# Patient Record
Sex: FEMALE | Race: WHITE | NOT HISPANIC OR LATINO | URBAN - NONMETROPOLITAN AREA
[De-identification: names, ages, dates, MRNs, and addresses within clinical notes are randomized per-mention and may not be internally consistent; named-entity substitution may affect disease eponyms.]

---

## 2022-07-05 ENCOUNTER — OFFICE (OUTPATIENT)
Dept: URBAN - NONMETROPOLITAN AREA CLINIC 9 | Facility: CLINIC | Age: 22
End: 2022-07-05

## 2022-07-05 VITALS
HEART RATE: 81 BPM | RESPIRATION RATE: 18 BRPM | SYSTOLIC BLOOD PRESSURE: 111 MMHG | WEIGHT: 147 LBS | HEIGHT: 62 IN | DIASTOLIC BLOOD PRESSURE: 79 MMHG

## 2022-07-05 DIAGNOSIS — Z12.11 ENCOUNTER FOR SCREENING FOR MALIGNANT NEOPLASM OF COLON: ICD-10-CM

## 2022-07-05 DIAGNOSIS — R10.32 LEFT LOWER QUADRANT PAIN: ICD-10-CM

## 2022-07-05 DIAGNOSIS — R14.0 ABDOMINAL DISTENSION (GASEOUS): ICD-10-CM

## 2022-07-05 DIAGNOSIS — R11.0 NAUSEA: ICD-10-CM

## 2022-07-05 PROCEDURE — 99204 OFFICE O/P NEW MOD 45 MIN: CPT | Performed by: INTERNAL MEDICINE

## 2022-07-05 NOTE — SERVICENOTES
Given patient's alternating diarrhea and constipation I have counseled her to continue with soluble fiber use.  Will also obtain stool studies to rule out infectious or inflammatory cause.  If fecal calprotectin is elevated will plan for colonoscopy.  Will give ib Rohit given left lower quadrant abdominal cramping.  For her bloating I have counseled her on stopping dairy products, stopping chewing gum/drinking through straw/drinking carbonated beverages, as well as trialing off foods outlined above.  Also will obtain CSID testing.  If all is unremarkable and she continues with symptoms a 2 month follow up would consider cross-sectional imaging and if negative plan for initiation on a TCA with amitriptyline.

## 2022-07-05 NOTE — SERVICEHPINOTES
CHANDA PATEL   is a   22 yo  female  with no significant past medical history who presents to establish care for postprandial abdominal pain, alternating diarrhea/constipation, nausea, and bloating.  Patient reports since April she has developed severe pain in her left lower quadrant after eating.  The pain will wrap around into her back.  It has been rated as high as a 7/10.  She  denies any association of her bowel movements with improvement in the pain.  She denies any vomiting, weight loss, bright blood per rectum, melena, dysphagia, odynophagia, rashes.  She does endorse weight gain, fatigue, nausea, alternating diarrhea and constipation, and significant bloating.  She denies smoking or illicit drug use.  She will occasionally drink alcohol.  She does utilize Excedrin given frequent migraines.  She has been taking Benefiber and seen some improvement in her alternating diarrhea and constipation.  She does endorse drinking through a straw as well as drinking carbonated beverages.  She has never attempted a trial off dairy products.  She has not had any stool studies to rule out infectious cause.  She has never attempted ib Rohit.  She denies previously undergoing an EGD or colonoscopy.  She denies any family history of colorectal cancer or inflammatory bowel disease.  She is starting law school in the fall.  She has not noticed any association of her symptoms with stress.  Her only other daily medication is birth control.  She has been on this for 5 years with no recent changes.

## 2022-09-06 ENCOUNTER — OFFICE (OUTPATIENT)
Dept: URBAN - NONMETROPOLITAN AREA CLINIC 5 | Facility: CLINIC | Age: 22
End: 2022-09-06

## 2022-09-06 VITALS
HEART RATE: 86 BPM | HEIGHT: 62 IN | WEIGHT: 144 LBS | DIASTOLIC BLOOD PRESSURE: 78 MMHG | RESPIRATION RATE: 18 BRPM | SYSTOLIC BLOOD PRESSURE: 104 MMHG

## 2022-09-06 DIAGNOSIS — R11.0 NAUSEA: ICD-10-CM

## 2022-09-06 DIAGNOSIS — R14.0 ABDOMINAL DISTENSION (GASEOUS): ICD-10-CM

## 2022-09-06 DIAGNOSIS — R10.32 LEFT LOWER QUADRANT PAIN: ICD-10-CM

## 2022-09-06 PROCEDURE — 99214 OFFICE O/P EST MOD 30 MIN: CPT | Performed by: INTERNAL MEDICINE

## 2022-09-06 NOTE — SERVICEHPINOTES
Sharon Hodges   is a   21   year old  female   with no significant past medical history who presents for follow up of postprandial abdominal pain, alternating diarrhea/constipation, nausea, and bloating.  Patient at initial visit in July reported since April she had developed severe pain in her left lower quadrant after eating.  The pain would wrap around into her back.  It has been rated as high as a 7/10.  She  denied any association of her bowel movements with improvement in the pain.  She denied any vomiting, weight loss, bright blood per rectum, melena, dysphagia, odynophagia, rashes.  She denied smoking or illicit drug use.  She denied any family history of colorectal cancer or inflammatory bowel disease.   After prior visit patient was counseled on FODMAP diet, attempting to trial off dairy and gluten products, and utilizing soluble fiber ib Guard.  She presents follow-up today.  She reports she feels much better.  She never completed previously ordered stool studies.  She reports she made dietary changes with the FODMAP diet, cutting out gluten, and cutting out dairy.  She then re-introduced dairy with no worsening of her symptoms.  She attempted to reintroduce gluten and developed symptoms.  Therefore she has cut gluten out of her diet and also cut out carbonated beverages with significant improvement overall.  She rarely has bloating.  She has been moving her bowels more regularly.  She is currently enrolled in law school.

## 2022-09-06 NOTE — SERVICENOTES
Patient reports she is doing well.  She is taking fiber and ib Rohit with significant improvement in her symptoms.  She has also cut out gluten and carbonated beverages.  Will plan to see back in 6 months or sooner if issues arise.